# Patient Record
Sex: MALE | Race: WHITE
[De-identification: names, ages, dates, MRNs, and addresses within clinical notes are randomized per-mention and may not be internally consistent; named-entity substitution may affect disease eponyms.]

---

## 2017-07-24 ENCOUNTER — HOSPITAL ENCOUNTER (OUTPATIENT)
Dept: HOSPITAL 76 - LAB.WCP | Age: 22
Discharge: HOME | End: 2017-07-24
Attending: FAMILY MEDICINE
Payer: COMMERCIAL

## 2017-07-24 DIAGNOSIS — R07.89: Primary | ICD-10-CM

## 2017-07-24 PROCEDURE — 85651 RBC SED RATE NONAUTOMATED: CPT

## 2017-07-24 PROCEDURE — 86140 C-REACTIVE PROTEIN: CPT

## 2017-07-24 PROCEDURE — 36415 COLL VENOUS BLD VENIPUNCTURE: CPT

## 2018-08-01 ENCOUNTER — HOSPITAL ENCOUNTER (OUTPATIENT)
Dept: HOSPITAL 76 - DI | Age: 23
Discharge: HOME | End: 2018-08-01
Attending: FAMILY MEDICINE
Payer: COMMERCIAL

## 2018-08-01 DIAGNOSIS — M25.562: Primary | ICD-10-CM

## 2018-08-02 NOTE — MRI REPORT
Procedure Date:  08/01/2018   

Accession Number:  [id number] / [id number]                    

Procedure:  MRI - Knee LT W/O CPT Code:  

 

FULL RESULT:

 

 

EXAM:

LEFT KNEE MRI WITHOUT CONTRAST

 

EXAM DATE: 8/1/2018 06:30 PM.

 

CLINICAL HISTORY: Left knee pain and popping. No known trauma.

 

COMPARISON: KNEE 3 VIEW LT 03/22/2018 10:35 AM.

 

TECHNIQUE: Multiplanar, multisequence T1-weighted and fluid-sensitive 

sequences of the knee without contrast. Other: None.

 

FINDINGS:

Bones: No fractures or subluxations. No marrow edema. No bone lesions.

 

Articular Cartilage: Unremarkable.

 

Medial Meniscus: The medial meniscus is intact.

 

Lateral Meniscus: The lateral meniscus is intact.

 

Cruciate Ligaments: The anterior and posterior cruciate ligaments are 

intact.

 

Collateral Ligaments: The medial collateral and lateral collateral 

ligamentous structures are intact.

 

Tendons: The quadriceps, patellar, semimembranosus, and popliteus tendons 

are unremarkable.

 

Musculature: No edema or fatty atrophy.

 

Other: No effusion. Tiny popliteal cyst. There is a small, approximately 

6 x 4 x 2 mm focus of hypointense tissue at the anterolateral aspect of 

the medial joint compartment, adjacent to the anterior horn medial 

meniscus and the anterior meniscal root ligament of the medial meniscus 

(sagittal images 14-16, coronal image 14, and axial image 13).  The 

medial and lateral retinacula are intact. The subcutaneous tissues and 

fat pads are unremarkable.

IMPRESSION:

1. A small 6 x 4 x 2 mm focus of tissue within the anterolateral aspect 

of the medial joint compartment. Differential may include a loose body, 

synovial plica, anomalous ligament.

2. No evidence of ligament or meniscal injury.

 

[facility] MUSCULOSKELETAL RADIOLOGY SECTION

## 2019-06-12 ENCOUNTER — HOSPITAL ENCOUNTER (OUTPATIENT)
Dept: HOSPITAL 76 - LAB.WCP | Age: 24
Discharge: HOME | End: 2019-06-12
Attending: FAMILY MEDICINE
Payer: COMMERCIAL

## 2019-06-12 DIAGNOSIS — R10.9: Primary | ICD-10-CM

## 2019-06-12 LAB
ALBUMIN DIAFP-MCNC: 4.4 G/DL (ref 3.2–5.5)
ALBUMIN/GLOB SERPL: 1.2 {RATIO} (ref 1–2.2)
ALP SERPL-CCNC: 57 IU/L (ref 42–121)
ALT SERPL W P-5'-P-CCNC: 135 IU/L (ref 10–60)
AMYLASE SERPL-CCNC: 55 U/L (ref 28–100)
ANION GAP SERPL CALCULATED.4IONS-SCNC: 9 MMOL/L (ref 6–13)
AST SERPL W P-5'-P-CCNC: 61 IU/L (ref 10–42)
BASOPHILS NFR BLD AUTO: 0.1 10^3/UL (ref 0–0.1)
BASOPHILS NFR BLD AUTO: 1.1 %
BILIRUB BLD-MCNC: 0.5 MG/DL (ref 0.2–1)
BUN SERPL-MCNC: 16 MG/DL (ref 6–20)
CALCIUM UR-MCNC: 9.2 MG/DL (ref 8.5–10.3)
CHLORIDE SERPL-SCNC: 102 MMOL/L (ref 101–111)
CO2 SERPL-SCNC: 24 MMOL/L (ref 21–32)
CREAT SERPLBLD-SCNC: 0.8 MG/DL (ref 0.6–1.2)
EOSINOPHIL # BLD AUTO: 0.4 10^3/UL (ref 0–0.7)
EOSINOPHIL NFR BLD AUTO: 6.3 %
ERYTHROCYTE [DISTWIDTH] IN BLOOD BY AUTOMATED COUNT: 13.4 % (ref 12–15)
GFRSERPLBLD MDRD-ARVRAT: 119 ML/MIN/{1.73_M2} (ref 89–?)
GLOBULIN SER-MCNC: 3.6 G/DL (ref 2.1–4.2)
GLUCOSE SERPL-MCNC: 94 MG/DL (ref 70–100)
HGB UR QL STRIP: 14.9 G/DL (ref 14–18)
LIPASE SERPL-CCNC: 32 U/L (ref 22–51)
LYMPHOCYTES # SPEC AUTO: 2.1 10^3/UL (ref 1.5–3.5)
LYMPHOCYTES NFR BLD AUTO: 35.9 %
MCH RBC QN AUTO: 29.7 PG (ref 27–31)
MCHC RBC AUTO-ENTMCNC: 33.1 G/DL (ref 32–36)
MCV RBC AUTO: 89.7 FL (ref 80–94)
MONOCYTES # BLD AUTO: 0.4 10^3/UL (ref 0–1)
MONOCYTES NFR BLD AUTO: 7.5 %
NEUTROPHILS # BLD AUTO: 2.8 10^3/UL (ref 1.5–6.6)
NEUTROPHILS # SNV AUTO: 5.8 X10^3/UL (ref 4.8–10.8)
NEUTROPHILS NFR BLD AUTO: 49.2 %
PDW BLD AUTO: 8.6 FL (ref 7.4–11.4)
PLATELET # BLD: 274 10^3/UL (ref 130–450)
PROT SPEC-MCNC: 8 G/DL (ref 6.7–8.2)
RBC MAR: 5.01 10^6/UL (ref 4.7–6.1)
SODIUM SERPLBLD-SCNC: 135 MMOL/L (ref 135–145)

## 2019-06-12 PROCEDURE — 80053 COMPREHEN METABOLIC PANEL: CPT

## 2019-06-12 PROCEDURE — 85025 COMPLETE CBC W/AUTO DIFF WBC: CPT

## 2019-06-12 PROCEDURE — 36415 COLL VENOUS BLD VENIPUNCTURE: CPT

## 2019-06-12 PROCEDURE — 83690 ASSAY OF LIPASE: CPT

## 2019-06-12 PROCEDURE — 82150 ASSAY OF AMYLASE: CPT

## 2019-06-21 ENCOUNTER — HOSPITAL ENCOUNTER (OUTPATIENT)
Dept: HOSPITAL 76 - DI | Age: 24
Discharge: HOME | End: 2019-06-21
Attending: FAMILY MEDICINE
Payer: COMMERCIAL

## 2019-06-21 DIAGNOSIS — R10.9: Primary | ICD-10-CM

## 2019-06-21 PROCEDURE — 76705 ECHO EXAM OF ABDOMEN: CPT

## 2019-06-21 NOTE — ULTRASOUND REPORT
Reason:  ABDOMINAL PAIN

Procedure Date:  06/21/2019   

Accession Number:  [id number] / [id number]                    

Procedure:  US  - Abdomen Limited CPT Code:  

 

FULL RESULT:

 

 

EXAM:

ABDOMEN ULTRASOUND LIMITED, RUQ

 

EXAM DATE: 6/21/2019 03:52 PM.

 

CLINICAL HISTORY: Abdominal pain.

 

COMPARISON: None.

 

TECHNIQUE: Real-time scanning was performed with static images obtained.

 

FINDINGS:

Liver: Increased hepatic echogenicity and coarseness of echotexture which 

limits evaluation for underlying masses. No mass is detected. The liver 

measures at least 16.8 cm. Main portal vein flow: Hepatopetal.

 

Gallbladder: Normal. No stones, wall thickening, or sonographic Sherman's 

sign.

 

Biliary System: CBD measures 3 mm. No intrahepatic or extrahepatic ductal 

dilatation.

 

Other: The right kidney measures 10.4 cm in maximal sagittal dimension 

and demonstrates no gross hydronephrosis.

IMPRESSION: Normal. No cholelithiasis or cholecystitis.

 

[facility]